# Patient Record
Sex: FEMALE | Race: WHITE | NOT HISPANIC OR LATINO | Employment: UNEMPLOYED | URBAN - METROPOLITAN AREA
[De-identification: names, ages, dates, MRNs, and addresses within clinical notes are randomized per-mention and may not be internally consistent; named-entity substitution may affect disease eponyms.]

---

## 2023-08-14 ENCOUNTER — OFFICE VISIT (OUTPATIENT)
Dept: PRIMARY CARE CLINIC | Facility: CLINIC | Age: 43
End: 2023-08-14
Payer: COMMERCIAL

## 2023-08-14 VITALS
OXYGEN SATURATION: 99 % | HEART RATE: 60 BPM | SYSTOLIC BLOOD PRESSURE: 102 MMHG | TEMPERATURE: 98 F | WEIGHT: 133.63 LBS | BODY MASS INDEX: 22.81 KG/M2 | DIASTOLIC BLOOD PRESSURE: 62 MMHG | HEIGHT: 64 IN | RESPIRATION RATE: 16 BRPM

## 2023-08-14 DIAGNOSIS — K59.01 SLOW TRANSIT CONSTIPATION: Primary | ICD-10-CM

## 2023-08-14 DIAGNOSIS — J30.9 CHRONIC ALLERGIC RHINITIS: ICD-10-CM

## 2023-08-14 DIAGNOSIS — L29.9 PRURITIC CONDITION: ICD-10-CM

## 2023-08-14 PROCEDURE — 99215 OFFICE O/P EST HI 40 MIN: CPT | Mod: S$GLB,,, | Performed by: FAMILY MEDICINE

## 2023-08-14 PROCEDURE — 3078F PR MOST RECENT DIASTOLIC BLOOD PRESSURE < 80 MM HG: ICD-10-PCS | Mod: CPTII,S$GLB,, | Performed by: FAMILY MEDICINE

## 2023-08-14 PROCEDURE — 99215 PR OFFICE/OUTPT VISIT, EST, LEVL V, 40-54 MIN: ICD-10-PCS | Mod: S$GLB,,, | Performed by: FAMILY MEDICINE

## 2023-08-14 PROCEDURE — 99999 PR PBB SHADOW E&M-NEW PATIENT-LVL III: CPT | Mod: PBBFAC,,, | Performed by: FAMILY MEDICINE

## 2023-08-14 PROCEDURE — 3008F BODY MASS INDEX DOCD: CPT | Mod: CPTII,S$GLB,, | Performed by: FAMILY MEDICINE

## 2023-08-14 PROCEDURE — 3074F PR MOST RECENT SYSTOLIC BLOOD PRESSURE < 130 MM HG: ICD-10-PCS | Mod: CPTII,S$GLB,, | Performed by: FAMILY MEDICINE

## 2023-08-14 PROCEDURE — 3074F SYST BP LT 130 MM HG: CPT | Mod: CPTII,S$GLB,, | Performed by: FAMILY MEDICINE

## 2023-08-14 PROCEDURE — 1159F MED LIST DOCD IN RCRD: CPT | Mod: CPTII,S$GLB,, | Performed by: FAMILY MEDICINE

## 2023-08-14 PROCEDURE — 3078F DIAST BP <80 MM HG: CPT | Mod: CPTII,S$GLB,, | Performed by: FAMILY MEDICINE

## 2023-08-14 PROCEDURE — 99999 PR PBB SHADOW E&M-NEW PATIENT-LVL III: ICD-10-PCS | Mod: PBBFAC,,, | Performed by: FAMILY MEDICINE

## 2023-08-14 PROCEDURE — 1159F PR MEDICATION LIST DOCUMENTED IN MEDICAL RECORD: ICD-10-PCS | Mod: CPTII,S$GLB,, | Performed by: FAMILY MEDICINE

## 2023-08-14 PROCEDURE — 3008F PR BODY MASS INDEX (BMI) DOCUMENTED: ICD-10-PCS | Mod: CPTII,S$GLB,, | Performed by: FAMILY MEDICINE

## 2023-08-14 RX ORDER — LACTOBACIL 2/BIFIDO 1/S.THERMO 450B CELL
1 PACKET (EA) ORAL DAILY
Qty: 30 CAPSULE | Refills: 5 | Status: SHIPPED | OUTPATIENT
Start: 2023-08-14

## 2023-08-14 RX ORDER — CHOLECALCIFEROL (VITAMIN D3) 25 MCG
1000 TABLET ORAL DAILY
COMMUNITY

## 2023-08-14 NOTE — ASSESSMENT & PLAN NOTE
Question allergy versus bacterial juan manuel December doses.  Waiting on stool studies to come back.  Advised patient to upload this in the chart so I can see what they are looking at.  We will try a prescription probiotic.  May also want to consider a liquid juicing detox program or even oral nystatin to see if this can help for possible yeast infection

## 2023-08-14 NOTE — PROGRESS NOTES
"    [facility]       [address] [place] [address]      [phone]     Danielle Salinas MD         .      Office Visit  08/14/2023  [id number]      SUBJECTIVE     HPI:  Brittany Jimenez is a 43 y.o. female presents today in clinic for "Follow-up  ."   [place] [facility] [place] have labs done with dr cuba as she is having concerns about possible parasite infection- she is waiting on stool cultures. Patient brought labs were brought in labs for my review. She did travel a lot to different camps over the summer.  She had allergy testing which showed shrimp and dairy and tried removing this from diet, though she has intermittent itching of ears and anus- has taken oral nystatin but doesn't recall if this was beneficial.  She is using free and clear detergents. Patient is on allergy shots and does think it helps.  She has otherwise been healthy and just struggles with allergies for which she is getting treatment for.      Follow-up        ROS   Review of symptoms are negative except as noted.     PAST MEDICAL HISTORY     Past Medical History:   Diagnosis Date    Chiari I malformation 2014    MTHFR mutation        Past Surgical History:   Procedure Laterality Date    TONSILLECTOMY      TYMPANOSTOMY TUBE PLACEMENT         Social History     Socioeconomic History    Marital status:    Tobacco Use    Smoking status: Never     Passive exposure: Never   Substance and Sexual Activity    Alcohol use: Yes    Drug use: Never       Allergies as of 08/14/2023    (No Known Allergies)       HOME MEDS     Current Outpatient Medications   Medication Instructions    EPINEPHrine (AUVI-Q) 0.3 mg/0.3 mL AtIn Use as directed for allergic emergency.    Lactobac 2-Bifido 1-S. therm (VSL#3) 112.5 billion cell Cap 1 Capful, Oral, Daily    levocetirizine (XYZAL) 5 mg, Oral, Nightly    vitamin D (VITAMIN D3) 1,000 Units, Oral, Daily       The following were " "updated and reviewed by myself in the chart: medications, past medical history, past surgical history, family history, social history, and allergies.        Objective    OBJECTIVE   /62   Pulse 60   Temp 98.2 °F (36.8 °C)   Resp 16   Ht 5' 4" (1.626 m)   Wt 60.6 kg (133 lb 9.6 oz)   LMP 07/29/2023   SpO2 99%   BMI 22.93 kg/m²     Wt Readings from Last 2 Encounters:   08/14/23 60.6 kg (133 lb 9.6 oz)       BP Readings from Last 2 Encounters:   08/14/23 102/62          Physical Exam  Vitals and nursing note reviewed.   Constitutional:       Appearance: Normal appearance. She is normal weight.      Comments: Fatigued.   HENT:      Head: Normocephalic and atraumatic.      Nose: Nose normal.   Eyes:      Extraocular Movements: Extraocular movements intact.      Conjunctiva/sclera: Conjunctivae normal.      Pupils: Pupils are equal, round, and reactive to light.      Comments: Periorbital puffiness   Cardiovascular:      Rate and Rhythm: Normal rate and regular rhythm.   Pulmonary:      Effort: Pulmonary effort is normal.      Breath sounds: Normal breath sounds.   Abdominal:      General: Abdomen is flat.      Palpations: Abdomen is soft.      Tenderness: There is no abdominal tenderness.   Musculoskeletal:         General: No swelling or tenderness. Normal range of motion.      Cervical back: Normal range of motion.   Lymphadenopathy:      Cervical: No cervical adenopathy.   Skin:     General: Skin is warm.      Findings: No lesion or rash.      Comments:  dry coarse hair   Neurological:      General: No focal deficit present.      Mental Status: She is alert. Mental status is at baseline.   Psychiatric:         Mood and Affect: Mood normal.         Behavior: Behavior normal.               LABS      LAB RESULTS, IF AVAILABLE, ARE DISCUSSED WITH PATIENT AND POSTED TO PATIENT PORTAL     ASSESSMENT & PLAN     1. Slow transit constipation  Assessment & Plan:  Diet is not optimal, but she is doing more test " "with Dr. Rivera, functional medicine, and is going to work on eating better.  She was advised to consider 0 type diet    Orders:  -     Lactobac 2-Bifido 1-S. therm (VSL#3) 112.5 billion cell Cap; Take 1 Capful by mouth once daily.  Dispense: 30 capsule; Refill: 5    2. Chronic allergic rhinitis  Assessment & Plan:  Controlled on allergy shots      3. Pruritic condition  Assessment & Plan:  Question allergy versus bacterial juan manuel December doses.  Waiting on stool studies to come back.  Advised patient to upload this in the chart so I can see what they are looking at.  We will try a prescription probiotic.  May also want to consider a liquid juicing detox program or even oral nystatin to see if this can help for possible yeast infection            I spent a total of 55 minutes face to face and non-face to face on the date of this visit.This includes time preparing to see the patient (eg, review of tests, notes), obtaining and/or reviewing additional history from an independent historian and/or outside medical records, documenting clinical information in the electronic health record, independently interpreting results and/or communicating results to the patient/family/caregiver, or care coordinator.      Disclaimer: Portions of this record may have been created with voice recognition software. Occasional wrong-word or "sound-a-like" substitutions may have occurred due to inherent limitations of voice recognition software. Read the chart carefully and recognize, using context, where substitutions have occurred."    Signed by:  Danielle Salinas MD           "

## 2023-08-14 NOTE — ASSESSMENT & PLAN NOTE
Diet is not optimal, but she is doing more test with Dr. Rivera, functional medicine, and is going to work on eating better.  She was advised to consider 0 type diet

## 2023-08-16 DIAGNOSIS — Z12.31 OTHER SCREENING MAMMOGRAM: ICD-10-CM
